# Patient Record
Sex: MALE | Race: WHITE | Employment: FULL TIME | URBAN - METROPOLITAN AREA
[De-identification: names, ages, dates, MRNs, and addresses within clinical notes are randomized per-mention and may not be internally consistent; named-entity substitution may affect disease eponyms.]

---

## 2019-08-30 ENCOUNTER — HOSPITAL ENCOUNTER (OUTPATIENT)
Dept: PHYSICAL THERAPY | Age: 34
Discharge: HOME | End: 2019-08-30
Payer: MEDICAID

## 2019-08-30 PROCEDURE — 97110 THERAPEUTIC EXERCISES: CPT

## 2019-08-30 PROCEDURE — 97161 PT EVAL LOW COMPLEX 20 MIN: CPT

## 2019-08-30 PROCEDURE — 97012 MECHANICAL TRACTION THERAPY: CPT

## 2019-08-30 NOTE — PROGRESS NOTES
PT DAILY TREATMENT NOTE/CERVICAL [id number]    Patient Name: Mirella Chu  Date:2019  : 1985  [x]  Patient  Verified  Payor: BLUE CROSS MEDICAID / Plan: Elizabeth Bailey / Product Type: Managed Care Medicaid /    In time:1055  Out time:1155  Total Treatment Time (min): 30  Visit #: 1 of 8    Treatment Area: Neck pain [M54.2]    SUBJECTIVE  Pain Level (0-10 scale): 0-10  []constant [x]intermittent [x]improving []worsening []no change since onset    Any medication changes, allergies to medications, adverse drug reactions, diagnosis change, or new procedure performed?: [x] No    [] Yes (see summary sheet for update)  Subjective functional status/changes:     Patient has CC of right cervical and upper trapezius pain since 2019 with no clear reason for onset. Patient describes pain as sharp and shooting. Pain fluctuates with no specific pattern. Sometimes increases with sleeping and sometimes occurs after long term sitting or awkward motions. Denies numbness/tingling into either UE. Denies popping/clicking. Aggravating factors: unclear. Alleviating factors: self stretching/manipulation of neck resolves pain. Denies red flags: SOB, chest pain, dizziness/lightheadedness, blurred/double vision, HA, chills/fevers, night sweats, change in bowel/bladder control, abdominal pain, difficulty swallowing, slurred speech, unexplained weight gain/loss, nausea, vomiting. PMHx: unremarkable. Surgical Hx: none. Social Hx: , three kids/teenagers, single level home, no alcohol, no tobacco. PLOF: works full time as a , plays hockey, coaches baseball. CLOF: intermittently very limited in all ADLs when pain is severe. Diagnostic Imaging: MRI two months ago patient states bulging disc at C6-7. Patient has had two epidural steroid injections which may have reduced frequency of symptoms.       OBJECTIVE/EXAMINATION    30 min [x]Eval                  []Re-Eval       20 min Therapeutic Exercise:  [x] See flow sheet :   Rationale: increase ROM and increase strength to improve the patients ability to function without recurrence of cervical pain. 10 min Mechanical cervical traction 25 lbs. static   Rationale: increase tissue extensibility to improve patient's ability to function without recurrence of cervical pain. With   [] TE   [] TA   [] neuro   [] other: Patient Education: [x] Review HEP    [] Progressed/Changed HEP based on:   [] positioning   [] body mechanics   [] transfers   [] heat/ice application    [] other:          Physical Therapy Evaluation Cervical Spine     OBJECTIVE  Posture:  Flattened thoracic kyphosis, with mild increased forward head posture resulting in increased transition stress at C6-7. Cervical Retraction: [x] WNL    [] Abnormal:    Shoulder/Scapular Screen: [x] WNL    [] Abnormal:    Active Movements: [] N/A   [] Too acute   [] Other:  ROM % AROM % PROM Comments:pain, area   Forward flexion 37     Extension 72     SB right 37     SB left  38     Rotation right 82     Rotation left 77       Thoracic Spine: [] N/A    [] WNL   [x] Other: Flattened thoracic kyphosis    Palpation:   Deep palpation of cervical spine is negative for tenderness or reproduction of symptoms.         UE Myotome:   [] Unable to assess at this time                                                                            L (1-5) R (1-5) Pain   C1 - Cerivcal flex 4  [] Yes   [x] No   C2 - Cervical ext 4  [] Yes   [x] No   C3 - Cervical SB 4 4 [] Yes   [x] No   C4 - Shoulder shrug 5 5 [] Yes   [x] No   C5 - Shoulder abd 5 5 [] Yes   [x] No   C6 - Elbow Flex/wrist ext 5 5 [] Yes   [x] No   C7 - Elbow Ext/wrist flex 5 5 [] Yes   [x] No   C8 -Thumb ext (thumbs up) 5 5 [] Yes   [x] No   T1 - Finger abduction 5 5 [] Yes   [x] No       Upper Limb Tension Tests: [] N/A       Ulnar: [] R    [] L    [] +    [x] -       Median: [] R    [] L    [] +    [x] -       Radial: [] R    [] L    [] +    [x] -    Special Tests:  Cervical:        Distraction:  [] R    [] L    [] +    [x] -       Compression: [] R    [] L    [] +    [x] -    Thoracic Outlet Tests: [x] N/A       Adson's:  [] R    [] L    [] +    [] -       Hyperabduction: [] R    [] L    [] +    [] -       Jesse's:  [] R    [] L    [] +    [] -       Rosales:  [] R    [] L    [] +    [] -    Diaphragmatic Breathing: [x] Normal    [] Abnormal      Other tests/comments:       Pain Level (0-10 scale) post treatment: 0    ASSESSMENT/Changes in Function: 37 y.o. Male with history of MRI confirmed C6-7 disc bulge/hernitation referred to PT for cervical stabilization program and trial of mechanical cervical traction for symptom management. Patient presents with deficits in cervical side bend AROM and cervical strength. Patient will continue to benefit from skilled PT services to modify and progress therapeutic interventions, address functional mobility deficits, address ROM deficits, address strength deficits, analyze and address soft tissue restrictions, analyze and cue movement patterns, analyze and modify body mechanics/ergonomics and assess and modify postural abnormalities to attain remaining goals.      [x]  See Plan of Care  []  See progress note/recertification  []  See Discharge Summary         Progress towards goals / Updated goals:  See POC    PLAN  []  Upgrade activities as tolerated     [x]  Continue plan of care  []  Update interventions per flow sheet       []  Discharge due to:_  []  Other:_      Chin Martin, PT 8/30/2019  10:54 AM

## 2019-08-30 NOTE — PROGRESS NOTES
[facility] at [address] [place] [address] Drive: [phone]   Fax: [phone]  PLAN OF CARE / [address] [place] [address] FOR PHYSICAL THERAPY SERVICES  Patient Name: Mary Moore : 1985   Medical   Diagnosis: Neck pain [M54.2] Treatment Diagnosis: Cervical pain    Onset Date: 2019     Referral Source: Duane Parisian, MD [facility]): 2019   Prior Hospitalization: See medical history Provider #: [id number]   Prior Level of Function: works full time as a , plays hockey, coaches baseball   Comorbidities: none   Medications: Verified on Patient Summary List   The Plan of Care and following information is based on the information from the initial evaluation.   ===========================================================================================  Assessment / key information:  Mary Moore is a 37 y.o.  yo male presents with  history of MRI confirmed C6-7 disc bulge/hernitation referred to PT for cervical stabilization program and trial of mechanical cervical traction for symptom management. Patient presents with deficits in cervical side bend AROM and cervical strength.     Patient will continue to benefit from skilled PT services to modify and progress therapeutic interventions, address functional mobility deficits, address ROM deficits, address strength deficits, analyze and address soft tissue restrictions, analyze and cue movement patterns, analyze and modify body mechanics/ergonomics and assess and modify postural abnormalities to attain remaining goals.       Pt instructed in HEP and will f/u in clinic for PT.  ===========================================================================================  Eval Complexity: History LOW Complexity : Zero comorbidities / personal factors that will impact the outcome / POC;  Examination  LOW Complexity : 1-2 Standardized tests and measures addressing body structure, function, activity limitation and / or participation in recreation ; Presentation LOW Complexity : Stable, uncomplicated ;  Decision Making MEDIUM Complexity : FOTO score of 26-74; Overall Complexity LOW   Problem List: pain affecting function, decrease ROM, decrease strength, decrease ADL/ functional abilitiies and decrease activity tolerance   Treatment Plan may include any combination of the following: Therapeutic exercise, Therapeutic activities, Neuromuscular re-education, Physical agent/modality, Manual therapy, Patient education and Functional mobility training  Patient / Family readiness to learn indicated by: asking questions, trying to perform skills and interest  Persons(s) to be included in education: patient (P)  Barriers to Learning/Limitations: no  Measures Taken: NA  Patient Goal (s): \"I want the pain to stay away. \"   Patient self reported health status: good  Rehabilitation Potential: good  Goals:  Short Term Goals: To be accomplished in 2 weeks:   Patient will report compliance with HEP 1x/day to aid in rehabilitation program.   Status at IE: Instructed in and provided written copy of initial HEP. Current:Same as IE      Patient will increase cervical ROM to full in all planes to aid in completion of ADLs. Status at IE: flexion 605, side bends 75%   Current: Same as IE    Long Term Goals: To be accomplished in 4 weeks:   Patient will increase cervical strength to 5/5 MMT throughout to aid in completion of ADLs. Status at IE: cervical strength 4/5   Current: Same as IE     Patient will report pain no greater than 1-2/10 throughout a full week to aid in completion of ADLs. Status at IE: episodes of sharp, shooting right cervical pain 5-7x/wk. Current: Same as IE     Patent will be able to lift 20 lbs overhead to be able to return to full work duties. Status at IE: 5 pounds.    Current: Same as IE     Patient will improve FOTO score to 69 points to demonstrate improvement in functional status. Status at IE: FOTO 61   Current: Same as IE        Frequency / Duration:   Patient to be seen 2  times per week for 4  weeks:  Patient / Caregiver education and instruction: self care and exercises      . Therapist Signature: All Murillo DPT Date: 5/64/2194   Certification Period: NA Time: 1:17 PM   ===========================================================================================  I certify that the above Physical Therapy Services are being furnished while the patient is under my care. I agree with the treatment plan and certify that this therapy is necessary. Physician Signature:        Date:       Time:     Please sign and return to [facility] or you may fax the signed copy to [phone]. Thank you.

## 2019-09-03 ENCOUNTER — HOSPITAL ENCOUNTER (OUTPATIENT)
Dept: PHYSICAL THERAPY | Age: 34
Discharge: HOME | End: 2019-09-03
Payer: MEDICAID

## 2019-09-03 PROCEDURE — 97012 MECHANICAL TRACTION THERAPY: CPT

## 2019-09-03 PROCEDURE — 97110 THERAPEUTIC EXERCISES: CPT

## 2019-09-03 NOTE — PROGRESS NOTES
PT DAILY TREATMENT NOTE    Patient Name: Vivek Machado  Date:9/3/2019  : 1985  [x]  Patient  Verified  Payor: BLUE CROSS MEDICAID / Plan: Hannah Farfan / Product Type: Managed Care Medicaid /    In time:955  Out time:1014  Total Treatment Time (min): 44  Total Timed Codes (min): 44  1:1 Treatment Time ( [address] only):    Visit #: 2 of 8    Treatment Area: Neck pain [M54.2]    SUBJECTIVE  Pain Level (0-10 scale): 1-2  Any medication changes, allergies to medications, adverse drug reactions, diagnosis change, or new procedure performed?: [x] No    [] Yes (see summary sheet for update)  Subjective functional status/changes:   [] No changes reported  \"I just finished a twelve hour work shift. My neck and upper trap are a bit sore and stiff. \"    OBJECTIVE    Modalities Rationale:     decrease edema and decrease pain to improve patient's ability to Complete ADLS   min []  Vasopneumatic Device, press/temp:    15 min [x]  Other: Mechanical cervical traction 28 lb. Max, 10 lb minimum  Hold 60 sec. Rest 10 sec. [] Skin assessment post-treatment (if applicable):    []  intact    []  redness- no adverse reaction     []redness  adverse reaction:          29 min Therapeutic Exercise:  [x] See flow sheet :   Rationale: increase ROM, increase strength and decrease pain to improve the patients ability to complete ADLs       With   [] TE   [] TA   [] neuro   [] other: Patient Education: [x] Review HEP    [] Progressed/Changed HEP based on:   [] positioning   [] body mechanics   [] transfers   [] heat/ice application    [] other:      Other Objective/Functional Measures: NA     Pain Level (0-10 scale) post treatment: 1    ASSESSMENT/Changes in Function: Patient responds well to treatment session. Patient reports his neck feels more relaxed after mechanical traction.  Provided information on home traction units to consider purchasing if mechanical traction continues to prove beneficial.. No adverse effects were noted from today's treatment session. Patient will continue to benefit from skilled PT services to modify and progress therapeutic interventions, address functional mobility deficits, address ROM deficits, address strength deficits, analyze and address soft tissue restrictions, analyze and cue movement patterns, analyze and modify body mechanics/ergonomics, assess and modify postural abnormalities,  and instruct in home and community integration to attain remaining goals. []  See Plan of Care  []  See progress note/recertification  []  See Discharge Summary         Progress towards goals / Updated goals:  Short Term Goals: To be accomplished in 2 weeks:                   Patient will report compliance with HEP 1x/day to aid in rehabilitation program.                   Status at IE: Instructed in and provided written copy of initial HEP. Current:Same as IE                      Patient will increase cervical ROM to full in all planes to aid in completion of ADLs. Status at IE: flexion 65%, side bends 75%                   Current: Same as IE     Long Term Goals: To be accomplished in 4 weeks:                   Patient will increase cervical strength to 5/5 MMT throughout to aid in completion of ADLs. Status at IE: cervical strength 4/5                   Current: Same as IE                      Patient will report pain no greater than 1-2/10 throughout a full week to aid in completion of ADLs. Status at IE: episodes of sharp, shooting right cervical pain 5-7x/wk. Current: Same as IE                      Patent will be able to lift 20 lbs overhead to be able to return to full work duties. Status at IE: 5 pounds. Current: Same as IE                      Patient will improve FOTO score to 69 points to demonstrate improvement in functional status.                    Status at IE: FOTO 61 Current: Same as IE    PLAN  []  Upgrade activities as tolerated     [x]  Continue plan of care  []  Update interventions per flow sheet       []  Discharge due to:_  []  Other:_      Radhika Hernandez, PT, DPT 9/3/2019  10:10 AM    Future Appointments   Date Time Provider Raquel Bowser   9/6/2019  9:30 AM JOSE L Rogel [facility]   9/10/2019  9:30 AM Jacob Jacobs, PT, DPT [facility]   9/12/2019  8:30 AM JOSE L Alaniz [facility]   9/17/2019  9:30 AM JOSE L RogelHPRADHA [facility]   9/20/2019 10:00 AM JOSE L Rogel [facility]   9/24/2019  9:30 AM Jewels Benson PT [facility]   9/27/2019 10:00 AM JOSE L RogelHPRADHA [facility]

## 2019-09-10 ENCOUNTER — HOSPITAL ENCOUNTER (OUTPATIENT)
Dept: PHYSICAL THERAPY | Age: 34
Discharge: HOME | End: 2019-09-10
Payer: MEDICAID

## 2019-09-10 PROCEDURE — 97110 THERAPEUTIC EXERCISES: CPT

## 2019-09-10 PROCEDURE — 97012 MECHANICAL TRACTION THERAPY: CPT

## 2019-09-10 NOTE — PROGRESS NOTES
PT DAILY TREATMENT NOTE    Patient Name: Brianna Stock  Date:9/10/2019  : 1985  [x]  Patient  Verified  Payor: BLUE CROSS MEDICAID / Plan: Morales Santiago / Product Type: Managed Care Medicaid /    In time:928  Out time:1015  Total Treatment Time (min): 47  Total Timed Codes (min): 29  1:1 Treatment Time ( only):    Visit #: 3 of 8    Treatment Area: Neck pain [M54.2]    SUBJECTIVE  Pain Level (0-10 scale): 0  Any medication changes, allergies to medications, adverse drug reactions, diagnosis change, or new procedure performed?: [x] No    [] Yes (see summary sheet for update)  Subjective functional status/changes:   [] No changes reported  \"Still getting the shooting pains off and on at the right side of my neck and into right shoulder. Sometimes the pain is pretty strong, up to a 5/10, then later I have no pain at all. OBJECTIVE    Modalities Rationale:     decrease edema and decrease pain to improve patient's ability to Complete ADLS   min []  Vasopneumatic Device, press/temp:    15 min []  Other: Mechanical cervical traction 31 pounds. [] Skin assessment post-treatment (if applicable):    []  intact    []  redness- no adverse reaction     []redness  adverse reaction:        29 min Therapeutic Exercise:  [x] See flow sheet :   Rationale: increase ROM, increase strength and decrease pain to improve the patients ability to complete ADLs         With   [] TE   [] TA   [] neuro   [] other: Patient Education: [x] Review HEP    [] Progressed/Changed HEP based on:   [] positioning   [] body mechanics   [] transfers   [] heat/ice application    [] other:      Other Objective/Functional Measures: NA     Pain Level (0-10 scale) post treatment: 0    ASSESSMENT/Changes in Function: Patient expressing mild frustration with persistence of intermittent episodes of sharp right cervical/shoulder pain. Patient responds well to treatment session.  Tolerating gradual progression of cervical stabilization exercises without onset of episode of sharp pain. No adverse effects were noted from today's treatment session     Patient will continue to benefit from skilled PT services to modify and progress therapeutic interventions, address functional mobility deficits, address ROM deficits, address strength deficits, analyze and address soft tissue restrictions, analyze and cue movement patterns, analyze and modify body mechanics/ergonomics, assess and modify postural abnormalities,  and instruct in home and community integration to attain remaining goals. []  See Plan of Care  []  See progress note/recertification  []  See Discharge Summary         Progress towards goals / Updated goals:  Short Term Goals: To be accomplished in 2 weeks:                   Patient will report compliance with HEP 1x/day to aid in rehabilitation program.                   Status at IE: Instructed in and provided written copy of initial HEP.                  Current:Same as IE                      Patient will increase cervical ROM to full in all planes to aid in completion of ADLs.                  Status at IE: flexion 65%, side bends 75%                   Current: Same as IE     Long Term Goals: To be accomplished in 4 weeks:                   Patient will increase cervical strength to 5/5 MMT throughout to aid in completion of ADLs.                  Status at IE: cervical strength 4/5                   Current: Same as IE                      Patient will report pain no greater than 1-2/10 throughout a full week to aid in completion of ADLs.                  Status at IE: episodes of sharp, shooting right cervical pain 5-7x/wk.                  Current: Same as IE                      Patent will be able to lift 20 lbs overhead to be able to return to full work duties.                    Status at IE: 5 pounds.                   Current: Able to lift 14 pounds overhead without pain  9/10/19                      Patient will improve FOTO score to 69 points to demonstrate improvement in functional status.                    Status at IE: FOTO 61                   Current: Same as IE    PLAN  []  Upgrade activities as tolerated     [x]  Continue plan of care  []  Update interventions per flow sheet       []  Discharge due to:_  []  Other:_      Christina Norris, PT, DPT 9/10/2019  9:31 AM    Future Appointments   Date Time Provider Raquel Bowser   9/12/2019  8:30 AM Aubrie Hillman, PT [facility]   9/17/2019  9:30 AM [facility], PT [facility]   9/20/2019 10:00 AM [facility], PT [facility]   9/24/2019  9:30 AM [facility], PT [facility]   9/27/2019 10:00 AM [facility], PT [facility]

## 2019-09-12 ENCOUNTER — APPOINTMENT (OUTPATIENT)
Dept: PHYSICAL THERAPY | Age: 34
End: 2019-09-12
Payer: MEDICAID

## 2019-09-17 ENCOUNTER — APPOINTMENT (OUTPATIENT)
Dept: PHYSICAL THERAPY | Age: 34
End: 2019-09-17
Payer: MEDICAID

## 2019-09-20 ENCOUNTER — APPOINTMENT (OUTPATIENT)
Dept: PHYSICAL THERAPY | Age: 34
End: 2019-09-20
Payer: MEDICAID

## 2019-09-24 ENCOUNTER — APPOINTMENT (OUTPATIENT)
Dept: PHYSICAL THERAPY | Age: 34
End: 2019-09-24
Payer: MEDICAID

## 2019-09-27 ENCOUNTER — HOSPITAL ENCOUNTER (OUTPATIENT)
Dept: PHYSICAL THERAPY | Age: 34
Discharge: HOME | End: 2019-09-27
Payer: MEDICAID

## 2019-09-27 PROCEDURE — 97012 MECHANICAL TRACTION THERAPY: CPT

## 2019-09-27 PROCEDURE — 97110 THERAPEUTIC EXERCISES: CPT

## 2019-09-27 NOTE — PROGRESS NOTES
[facility] at [address] [place] [address] Drive: [phone]   Fax: [phone]  Progress Note  Patient Name: Nikolay Nicholas : 1985   Medical   Diagnosis: Neck pain [M54.2] Treatment Diagnosis: Neck pain   Onset Date: 2019     Referral Source: Oniel Parker MD [facility]): 19   Prior Hospitalization: See medical history Provider #: [id number]   Prior Level of Function: Works full time as  for twelve hour shifts, plays hockey, coaches baseball. Comorbidities: none   Medications: Verified on Patient Summary List      ===========================================================================================  Assessment / Summary of Care:  Nikolay Nicholas is a 37 y.o.  yo male receiving PT for instruction in cervical stabilization exercises and trial of mechanical cervical traction. Patient is improving in cervical AROM and exercise tolerance but is still noting intermittent sharp cervical pain. Notes improvement with mechanical cervical traction and has been provided information for consideration of home cervical traction unit.    ===========================================================================================    Plan:Continue therapy per initial plan/protocol at a frequency of  1-2 x per week for 3 weeks    Goals:   Short Term Goals: To be accomplished in 2 weeks:                   Patient will report compliance with HEP 1x/day to aid in rehabilitation program.                   Status at IE: Instructed in and provided written copy of initial HEP.                  HZEGWZY: [id number] and consistent with stretches, working on consistency with dumbbell HEP  19                      Patient will increase cervical ROM to full in all planes to aid in completion of ADLs.                    Status at IE: flexion 65%, side bends 75%                   Current: flexion improved to 80%, aide bends to 100% progressing 9/27/19     Long Term Goals: To be accomplished in 4 weeks:                   Patient will increase cervical strength to 5/5 MMT throughout to aid in completion of ADLs.                  Status at IE: cervical strength 4/5                   Current: cervical strength improved to 4+5/, progressing 9/27/19                      Patient will report pain no greater than 1-2/10 throughout a full week to aid in completion of ADLs.                  Status at IE: episodes of sharp, shooting right cervical pain 5-7x/wk.                  Current: Still experiencing sharp, shooting pains at a daily level. Not progressing 9/27/19                      Patent will be able to lift 20 lbs overhead to be able to return to full work duties.                  Status at IE: 5 pounds.                   Current: Able to lift 20 pounds overhead without pain  9/27/19                      Patient will improve FOTO score to 69 points to demonstrate improvement in functional status.                  Status at IE: FOTO 61                   Current: To be reassessed next session 9/27/19       ===========================================================================================  Subjective: Patient reports cervical traction seems to help reduce frequency of cervical sharp pain. Objective: Refer to current status in goals section above. Therapist Signature: Ani Shirley PT, DPT Date: 9/98/2569   Re-Certification:  Time: 8:68 PM       I certify that the above Therapy Services are being furnished while the patient is under my care. I agree with the treatment plan and certify that this therapy is necessary. [] I have read the above and request that my patient continue as recommended.   [] I have read the above report and request that my patient continue therapy with the following changes/special instructions: ______________________________________  [] I have read the above report and request that my patient be discharged from therapy    Physician's Signature:____________Date:_________TIME:________    ** Signature, Date and Time must be completed for valid certification **    [facility] at [address]         Phone: [phone]   Fax: [phone]  .

## 2019-09-27 NOTE — PROGRESS NOTES
PT DAILY TREATMENT NOTE    Patient Name: Jus Pederson  Date:2019  : 1985  [x]  Patient  Verified  Payor: [employer]ICA[employer] / Plan: [employer] / Product Type: Managed Care Medicaid /    In time:957  Out time:1040  Total Treatment Time (min): 43  Total Timed Codes (min): 28  1:1 Treatment Time ([address] only):    Visit #: 4 of 8    Treatment Area: Neck pain [M54.2]    SUBJECTIVE  Pain Level (0-10 scale): 3  Any medication changes, allergies to medications, adverse drug reactions, diagnosis change, or new procedure performed?: [x] No    [] Yes (see summary sheet for update)  Subjective functional status/changes:   [] No changes reported  \"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 But, I am watching my posture more carefully and trying to do the stretches you taught me fairly often. \"    OBJECTIVE    Modalities Rationale:     decrease edema and decrease pain to improve patient's ability to Complete ADLS   min []  Vasopneumatic Device, press/temp:    15 min [x]  Other: Mechanical cervical traction 33 lbs. Intermittent. [] Skin assessment post-treatment (if applicable):    []  intact    []  redness- no adverse reaction     []redness  adverse reaction:          28 min Therapeutic Exercise:  [x] See flow sheet :   Rationale: increase ROM, increase strength and decrease pain to improve the patients ability to complete ADLs    With   [] TE   [] TA   [] neuro   [] other: Patient Education: [x] Review HEP    [] Progressed/Changed HEP based on:   [] positioning   [] body mechanics   [] transfers   [] heat/ice application    [] other:      Other Objective/Functional Measures: NA     Pain Level (0-10 scale) post treatment: 1    ASSESSMENT/Changes in Function: Patient responds well to treatment session. Patient notes decreased pain with mechanical traction and is tolerating stabilization exercises well. No adverse effects were noted from today's treatment session.      Patient will continue to benefit from skilled PT services to modify and progress therapeutic interventions, address functional mobility deficits, address ROM deficits, address strength deficits, analyze and address soft tissue restrictions, analyze and cue movement patterns, analyze and modify body mechanics/ergonomics, assess and modify postural abnormalities,  and instruct in home and community integration to attain remaining goals. []  See Plan of Care  []  See progress note/recertification  []  See Discharge Summary         Progress towards goals / Updated goals:  Short Term Goals: To be accomplished in 2 weeks:                   Patient will report compliance with HEP 1x/day to aid in rehabilitation program.                   Status at IE: Instructed in and provided written copy of initial HEP.                  VQODHSY: [id number] and consistent with stretches, working on consistency with dumbbell HEP  9/27/19                      Patient will increase cervical ROM to full in all planes to aid in completion of ADLs.                  Status at IE: flexion 65%, side bends 75%                   Current: flexion improved to 80%, aide bends to 100% progressing 9/27/19     Long Term Goals: To be accomplished in 4 weeks:                   Patient will increase cervical strength to 5/5 MMT throughout to aid in completion of ADLs.                  Status at IE: cervical strength 4/5                   Current: cervical strength improved to 4+5/, progressing 9/27/19                      Patient will report pain no greater than 1-2/10 throughout a full week to aid in completion of ADLs.                  Status at IE: episodes of sharp, shooting right cervical pain 5-7x/wk.                  Current: Still experiencing sharp, shooting pains at a daily level. Not progressing 9/27/19                      Patent will be able to lift 20 lbs overhead to be able to return to full work duties.                    Status at IE: 5 pounds.                   Current: Able to lift 20 pounds overhead without pain  9/27/19                      Patient will improve FOTO score to 69 points to demonstrate improvement in functional status.                  Status at IE: FOTO 61                   Current: To be reassessed next session 9/27/19       PLAN  []  Upgrade activities as tolerated     [x]  Continue plan of care  []  Update interventions per flow sheet       []  Discharge due to:_  []  Other:_      Yanet Garcia PT, DPT 9/27/2019  10:12 AM    No future appointments.

## 2019-11-21 NOTE — PROGRESS NOTES
[facility] at [address] [place] [address] Drive: [phone]   Fax: [phone]  Discharge Summary  Patient Name: Anne Mccoy : 1985   Medical   Diagnosis: Neck pain [M54.2] Treatment Diagnosis: Neck pain   Onset Date: 2019     Referral Source: Satya Juares MD [facility]): 19   Prior Hospitalization: See medical history Provider #: [id number]   Prior Level of Function: Works full time as  for twelve hour shifts, plays hockey, coaches baseball. Comorbidities: none   Medications: Verified on Patient Summary List      ===========================================================================================  Assessment / Summary of Care:  Anne Mccoy is a 37 y.o.   male who was receiving a PT trial of mechanical cervical traction to address cervical radiculopathy. Patient noted good temporary relief with mechanical cervical traction. However, patient was inconsistent in PT due to work schedule conflicts.  Patient cancelled PT frequently and was No Show for last scheduled sessions.    ===========================================================================================    Plan: Discharge due to patient abdication.      ===========================================================================================    Therapist Signature: Lauren Bal PT, DPT Date: 2019     Time: 2:37 PM